# Patient Record
Sex: MALE | Race: WHITE | NOT HISPANIC OR LATINO | Employment: FULL TIME | URBAN - METROPOLITAN AREA
[De-identification: names, ages, dates, MRNs, and addresses within clinical notes are randomized per-mention and may not be internally consistent; named-entity substitution may affect disease eponyms.]

---

## 2019-04-11 ENCOUNTER — OFFICE VISIT (OUTPATIENT)
Dept: INTERNAL MEDICINE | Facility: CLINIC | Age: 24
End: 2019-04-11

## 2019-04-11 VITALS
DIASTOLIC BLOOD PRESSURE: 88 MMHG | HEART RATE: 87 BPM | OXYGEN SATURATION: 98 % | RESPIRATION RATE: 12 BRPM | BODY MASS INDEX: 26.14 KG/M2 | HEIGHT: 72 IN | WEIGHT: 193 LBS | TEMPERATURE: 98.5 F | SYSTOLIC BLOOD PRESSURE: 128 MMHG

## 2019-04-11 DIAGNOSIS — F41.8 SITUATIONAL ANXIETY: ICD-10-CM

## 2019-04-11 DIAGNOSIS — J30.89 ENVIRONMENTAL AND SEASONAL ALLERGIES: ICD-10-CM

## 2019-04-11 DIAGNOSIS — H91.91 HEARING LOSS OF RIGHT EAR, UNSPECIFIED HEARING LOSS TYPE: ICD-10-CM

## 2019-04-11 DIAGNOSIS — F33.41 RECURRENT MAJOR DEPRESSIVE DISORDER, IN PARTIAL REMISSION (HCC): ICD-10-CM

## 2019-04-11 DIAGNOSIS — Z00.00 HEALTHCARE MAINTENANCE: Primary | ICD-10-CM

## 2019-04-11 PROCEDURE — 99385 PREV VISIT NEW AGE 18-39: CPT | Performed by: INTERNAL MEDICINE

## 2019-04-11 PROCEDURE — 93000 ELECTROCARDIOGRAM COMPLETE: CPT | Performed by: INTERNAL MEDICINE

## 2019-04-11 PROCEDURE — 90715 TDAP VACCINE 7 YRS/> IM: CPT | Performed by: INTERNAL MEDICINE

## 2019-04-11 PROCEDURE — 90471 IMMUNIZATION ADMIN: CPT | Performed by: INTERNAL MEDICINE

## 2019-04-11 RX ORDER — CITALOPRAM 40 MG/1
40 TABLET ORAL DAILY
COMMUNITY

## 2019-04-11 RX ORDER — PROPRANOLOL HYDROCHLORIDE 40 MG/1
40 TABLET ORAL 2 TIMES DAILY
COMMUNITY

## 2019-04-11 NOTE — PROGRESS NOTES
"Annual Exam (new pt cpe )      HPI  Mike Tracy is a 23 y.o. male presents to establish, new pt CPE: Last physician was pediatrician.   1. MDD - is on Citalopram.  Dx'd first 11/2018 but notes has had sx since age 13 or 14. Sees Dr. Narcisa Lopes in psych. Notes that plans to still seeing her.  Has seen therapist in past, has not seen recently. Therapist is one who recommended to see psych.  Is not depressed now and mood is good.  Wants to consider getting meds here.   Never inpt in past.  \"A long time ago\" had some SI, but \"nothing recently, even when I started tx\".  Never had any attempts.  2. Anxiety - has propranolol for use PRN. Feels like 'high stress situations' when \"physically reacts to it more than I should\".  Feels like med \"chills me out\".  Thinks right now financial anxiety has been big trigger issues. Work stress has been trigger in past. Never had overt panic attacks.  \"I have never been debilitated by my anxiety\". Uses B-blocker 4-6 x/ week.      Review of Systems   Constitution: Negative for chills, fever, malaise/fatigue, weight gain and weight loss.   HENT: Positive for hearing loss (in R ear, has some asymmetry and more noticeble in last few years. ). Negative for congestion, odynophagia and sore throat.    Eyes: Negative for discharge, double vision, pain and redness.        Last eye exam 2/2019; no glasses     Cardiovascular: Negative for chest pain, dyspnea on exertion, irregular heartbeat, leg swelling, near-syncope, palpitations and syncope.   Respiratory: Negative for cough and shortness of breath.    Endocrine: Negative for polydipsia, polyphagia and polyuria.   Hematologic/Lymphatic: Negative for bleeding problem. Does not bruise/bleed easily.   Skin: Negative for rash and suspicious lesions.   Musculoskeletal: Negative for joint pain, joint swelling, muscle cramps, muscle weakness and myalgias.   Gastrointestinal: Negative for constipation, diarrhea, dysphagia, heartburn, nausea and " "vomiting.   Genitourinary: Negative for dysuria, frequency, hematuria and hesitancy.   Neurological: Negative for dizziness, headaches and light-headedness.   Psychiatric/Behavioral: Negative for depression. The patient is nervous/anxious (on occasion). The patient does not have insomnia.    Allergic/Immunologic: Negative for environmental allergies (rare seasonal issues, will use Zyrtec PRN) and persistent infections.       Problem List:    Patient Active Problem List   Diagnosis   • Environmental and seasonal allergies   • Hearing loss on right   • Recurrent major depressive disorder, in partial remission (CMS/HCC)   • Situational anxiety       Medical History:    Past Medical History:   Diagnosis Date   • Acne     back and chest with scarring   • Anxiety    • Environmental and seasonal allergies 4/11/2019    Variable sx, never been tested in past    • Recurrent major depressive disorder, in partial remission (CMS/HCC) 4/11/2019    New dx in 11/2018, sees Dr. Narcisa Lopes in psych        Social History:    Social History     Socioeconomic History   • Marital status: Significant Other     Spouse name: Not on file   • Number of children: Not on file   • Years of education: Not on file   • Highest education level: Not on file   Occupational History   • Occupation: Manager, [employer]     Comment: [employer]   Tobacco Use   • Smoking status: Never Smoker   • Smokeless tobacco: Never Used   Substance and Sexual Activity   • Alcohol use: Yes     Drinks per session: 1 or 2     Comment: 1-2 beers/ daily   • Drug use: No   • Sexual activity: Yes     Partners: Female     Birth control/protection: Condom     Comment: one partner; no hx STD's   Lifestyle   • Physical activity:     Days per week: 2 days     Minutes per session: 30 min   • Stress: Not on file   Social History Narrative    Diet - \"relatively\" healthy; eats some junk food; eats fruits and vegetables but only \"sometimes\"    Exercise - 1-2x/ week, walking or gym    " Caffeine - coffee and rare energy drinks; 1 pot coffee daily       Family History:   Family History   Problem Relation Age of Onset   • Anxiety disorder Mother    • No Known Problems Father    • No Known Problems Sister        Surgical History:   Past Surgical History:   Procedure Laterality Date   • LASIK      12-   • WISDOM TOOTH EXTRACTION           Current Outpatient Medications:   •  citalopram (CeleXA) 40 MG tablet, Take 40 mg by mouth Daily., Disp: , Rfl:   •  propranolol (INDERAL) 40 MG tablet, Take 40 mg by mouth 2 (Two) Times a Day. Take 2 tablets prn, Disp: , Rfl:     Vitals:    04/11/19 1130   BP: 128/88   Pulse: 87   Resp: 12   Temp: 98.5 °F (36.9 °C)   SpO2: 98%       Physical Exam   Constitutional: He is oriented to person, place, and time. He appears well-developed and well-nourished. He is cooperative. No distress.   HENT:   Head: Normocephalic and atraumatic.   Right Ear: Hearing, tympanic membrane, external ear and ear canal normal.   Left Ear: Hearing, tympanic membrane, external ear and ear canal normal.   Nose: Nose normal.   Mouth/Throat: Uvula is midline, oropharynx is clear and moist and mucous membranes are normal. No oropharyngeal exudate.   Eyes: Conjunctivae, EOM and lids are normal. Pupils are equal, round, and reactive to light. Right eye exhibits no discharge. Left eye exhibits no discharge.   Neck: Normal range of motion and full passive range of motion without pain. Neck supple. Carotid bruit is not present. No thyroid mass and no thyromegaly present.   Cardiovascular: Normal rate, regular rhythm, S1 normal, S2 normal, normal heart sounds and intact distal pulses. Exam reveals no gallop and no friction rub.   No murmur heard.  Pulses:       Radial pulses are 2+ on the right side, and 2+ on the left side.        Dorsalis pedis pulses are 2+ on the right side, and 2+ on the left side.        Posterior tibial pulses are 2+ on the right side, and 2+ on the left side.    Pulmonary/Chest: Effort normal and breath sounds normal. No respiratory distress. He has no wheezes. He has no rhonchi. He has no rales.   Abdominal: Soft. Bowel sounds are normal. He exhibits no distension and no mass. There is no hepatosplenomegaly. There is no tenderness. There is no rebound and no guarding. Hernia confirmed negative in the right inguinal area and confirmed negative in the left inguinal area.   Genitourinary: Testes normal and penis normal. Right testis shows no mass. Left testis shows no mass. No discharge found.   Musculoskeletal: Normal range of motion. He exhibits no edema.     Vascular Status -  His right foot exhibits normal foot vasculature  and no edema. His left foot exhibits normal foot vasculature  and no edema.  Skin Integrity  -  His right foot skin is intact.His left foot skin is intact..  Lymphadenopathy:     He has no cervical adenopathy.     He has no axillary adenopathy.        Right: No inguinal adenopathy present.        Left: No inguinal adenopathy present.   Neurological: He is alert and oriented to person, place, and time. He has normal strength and normal reflexes. He displays no tremor. No cranial nerve deficit or sensory deficit. He exhibits normal muscle tone. Gait normal.   Skin: Skin is warm, dry and intact. No rash noted.        Psychiatric: He has a normal mood and affect. His speech is normal and behavior is normal. Cognition and memory are normal.   Vitals reviewed.        ECG 12 Lead  Date/Time: 4/11/2019 12:15 PM  Performed by: Terrance Grider MD  Authorized by: Terrance Grider MD   Comparison: not compared with previous ECG   Previous ECG: no previous ECG available  Rhythm: sinus rhythm  Rate: normal  BPM: 64  ST Segments: ST segments normal  T Waves: T waves normal  QRS axis: normal    Clinical impression: normal ECG  Comments: QTc - 380  Indication - new pt CPE, baseline              Assessment/ Plan  Diagnoses and all orders for this visit:    Healthcare  "maintenance  -     ECG 12 Lead  -     Tdap Vaccine Greater Than or Equal To 8yo IM  -     CBC & Differential  -     Comprehensive Metabolic Panel  -     Lipid Panel With LDL / HDL Ratio  -     TSH  -     UA / M With / Rflx Culture([facility] ONLY) - Urine, Clean Catch  -     Vitamin D 25 Hydroxy  -     HIV-1 / O / 2 Ag / Antibody 4th Generation  -     Chlamydia trachomatis, Neisseria gonorrhoeae, PCR - , Urine, Clean Catch    Environmental and seasonal allergies  -     CBC & Differential  -     Comprehensive Metabolic Panel    Hearing loss of right ear, unspecified hearing loss type    Recurrent major depressive disorder, in partial remission (CMS/HCC)  -     CBC & Differential  -     Comprehensive Metabolic Panel  -     TSH    Situational anxiety  -     CBC & Differential  -     Comprehensive Metabolic Panel  -     TSH    Other orders  -     citalopram (CeleXA) 40 MG tablet; Take 40 mg by mouth Daily.  -     propranolol (INDERAL) 40 MG tablet; Take 40 mg by mouth 2 (Two) Times a Day. Take 2 tablets prn        Return in about 1 year (around 4/11/2020) for Annual physical.      Discussion:  Mike Tracy is a 23 y.o. male presents to establish, new pt CPE: Last physician was pediatrician.   1. MDD - on Citalopram, per Dr. Narcisa Lopes in psych after dx in 11/2018. No inpt or suicide attempts in past. Sees talk therapist. Desires to have meds filled here, but was going to \"keep seeing psych\".  I d/w pt that I cannot take over Rx from specialist who he still sees.  He will need to choose to stay with her or get meds here and will need records sent here from psych if desires transfer of care. Did d/w pt need for < daily EOTH intake and increase in exercise; had clear discussion on these issues.   2. Anxiety - propranolol PRN for situational events, per psych.   3. Hx of acne - on back and chest, with scarring. Minimal active lesions at this time.  Monitor.   4. Seasonal allergies - mild, no prior testing.  Zyrtec PRN OK. "   5. HM - check  Labs; Flu - next season; Tdap - today; need vaccine records for review, pt to obtain and forward; add exercise and TLC diet; HIV CDC screen; U G/C screen as pt is sexually active.     RTC one year CPE, F labs prior

## 2019-04-19 LAB
25(OH)D3+25(OH)D2 SERPL-MCNC: 49.9 NG/ML (ref 30–100)
ALBUMIN SERPL-MCNC: 5.3 G/DL (ref 3.5–5.2)
ALBUMIN/GLOB SERPL: 2.8 G/DL
ALP SERPL-CCNC: 44 U/L (ref 39–117)
ALT SERPL-CCNC: 13 U/L (ref 1–41)
APPEARANCE UR: CLEAR
AST SERPL-CCNC: 12 U/L (ref 1–40)
BACTERIA #/AREA URNS HPF: ABNORMAL /HPF
BASOPHILS # BLD AUTO: 0.02 10*3/MM3 (ref 0–0.2)
BASOPHILS NFR BLD AUTO: 0.5 % (ref 0–1.5)
BILIRUB SERPL-MCNC: 0.8 MG/DL (ref 0.2–1.2)
BILIRUB UR QL STRIP: NEGATIVE
BUN SERPL-MCNC: 12 MG/DL (ref 6–20)
BUN/CREAT SERPL: 12 (ref 7–25)
C TRACH RRNA SPEC QL NAA+PROBE: NEGATIVE
CALCIUM SERPL-MCNC: 10.1 MG/DL (ref 8.6–10.5)
CASTS URNS MICRO: ABNORMAL
CASTS URNS QL MICRO: PRESENT /LPF
CHLORIDE SERPL-SCNC: 104 MMOL/L (ref 98–107)
CHOLEST SERPL-MCNC: 140 MG/DL (ref 0–200)
CO2 SERPL-SCNC: 26.8 MMOL/L (ref 22–29)
COLOR UR: YELLOW
CREAT SERPL-MCNC: 1 MG/DL (ref 0.76–1.27)
CRYSTALS URNS MICRO: ABNORMAL
EOSINOPHIL # BLD AUTO: 0.13 10*3/MM3 (ref 0–0.4)
EOSINOPHIL NFR BLD AUTO: 3.5 % (ref 0.3–6.2)
EPI CELLS #/AREA URNS HPF: ABNORMAL /HPF
ERYTHROCYTE [DISTWIDTH] IN BLOOD BY AUTOMATED COUNT: 13 % (ref 12.3–15.4)
GLOBULIN SER CALC-MCNC: 1.9 GM/DL
GLUCOSE SERPL-MCNC: 92 MG/DL (ref 65–99)
GLUCOSE UR QL: NEGATIVE
HCT VFR BLD AUTO: 45.3 % (ref 37.5–51)
HDLC SERPL-MCNC: 70 MG/DL (ref 40–60)
HGB BLD-MCNC: 15 G/DL (ref 13–17.7)
HGB UR QL STRIP: NEGATIVE
HIV 1+2 AB+HIV1 P24 AG SERPL QL IA: NON REACTIVE
IMM GRANULOCYTES # BLD AUTO: 0.01 10*3/MM3 (ref 0–0.05)
IMM GRANULOCYTES NFR BLD AUTO: 0.3 % (ref 0–0.5)
KETONES UR QL STRIP: NEGATIVE
LDLC SERPL CALC-MCNC: 64 MG/DL (ref 0–100)
LDLC/HDLC SERPL: 0.91 {RATIO}
LEUKOCYTE ESTERASE UR QL STRIP: NEGATIVE
LYMPHOCYTES # BLD AUTO: 1.2 10*3/MM3 (ref 0.7–3.1)
LYMPHOCYTES NFR BLD AUTO: 32.5 % (ref 19.6–45.3)
MCH RBC QN AUTO: 30.6 PG (ref 26.6–33)
MCHC RBC AUTO-ENTMCNC: 33.1 G/DL (ref 31.5–35.7)
MCV RBC AUTO: 92.4 FL (ref 79–97)
MICRO URNS: NORMAL
MICRO URNS: NORMAL
MONOCYTES # BLD AUTO: 0.32 10*3/MM3 (ref 0.1–0.9)
MONOCYTES NFR BLD AUTO: 8.7 % (ref 5–12)
MUCOUS THREADS URNS QL MICRO: PRESENT /HPF
N GONORRHOEA RRNA SPEC QL NAA+PROBE: NEGATIVE
NEUTROPHILS # BLD AUTO: 2.01 10*3/MM3 (ref 1.4–7)
NEUTROPHILS NFR BLD AUTO: 54.5 % (ref 42.7–76)
NITRITE UR QL STRIP: NEGATIVE
NRBC BLD AUTO-RTO: 0 /100 WBC (ref 0–0)
PH UR STRIP: 7 [PH] (ref 5–7.5)
PLATELET # BLD AUTO: 226 10*3/MM3 (ref 140–450)
POTASSIUM SERPL-SCNC: 4.5 MMOL/L (ref 3.5–5.2)
PROT SERPL-MCNC: 7.2 G/DL (ref 6–8.5)
PROT UR QL STRIP: NEGATIVE
RBC # BLD AUTO: 4.9 10*6/MM3 (ref 4.14–5.8)
RBC #/AREA URNS HPF: ABNORMAL /HPF
SODIUM SERPL-SCNC: 142 MMOL/L (ref 136–145)
SP GR UR: 1.03 (ref 1–1.03)
TRIGL SERPL-MCNC: 30 MG/DL (ref 0–150)
TSH SERPL DL<=0.005 MIU/L-ACNC: 1.47 MIU/ML (ref 0.27–4.2)
UNIDENT CRYS URNS QL MICRO: PRESENT /LPF
URINALYSIS REFLEX: NORMAL
UROBILINOGEN UR STRIP-MCNC: 0.2 MG/DL (ref 0.2–1)
VLDLC SERPL CALC-MCNC: 6 MG/DL
WBC # BLD AUTO: 3.69 10*3/MM3 (ref 3.4–10.8)
WBC #/AREA URNS HPF: ABNORMAL /HPF